# Patient Record
Sex: MALE | Race: WHITE | NOT HISPANIC OR LATINO | URBAN - METROPOLITAN AREA
[De-identification: names, ages, dates, MRNs, and addresses within clinical notes are randomized per-mention and may not be internally consistent; named-entity substitution may affect disease eponyms.]

---

## 2019-02-07 ENCOUNTER — INPATIENT (INPATIENT)
Facility: HOSPITAL | Age: 59
LOS: 3 days | Discharge: HOME | End: 2019-02-11
Attending: INTERNAL MEDICINE | Admitting: INTERNAL MEDICINE
Payer: MEDICAID

## 2019-02-07 VITALS
HEART RATE: 92 BPM | OXYGEN SATURATION: 97 % | WEIGHT: 176.37 LBS | TEMPERATURE: 96 F | DIASTOLIC BLOOD PRESSURE: 72 MMHG | SYSTOLIC BLOOD PRESSURE: 119 MMHG | RESPIRATION RATE: 18 BRPM

## 2019-02-07 DIAGNOSIS — H10.33 UNSPECIFIED ACUTE CONJUNCTIVITIS, BILATERAL: ICD-10-CM

## 2019-02-07 DIAGNOSIS — F10.10 ALCOHOL ABUSE, UNCOMPLICATED: ICD-10-CM

## 2019-02-07 DIAGNOSIS — R03.0 ELEVATED BLOOD-PRESSURE READING, WITHOUT DIAGNOSIS OF HYPERTENSION: ICD-10-CM

## 2019-02-07 LAB
ALBUMIN SERPL ELPH-MCNC: 4.4 G/DL — SIGNIFICANT CHANGE UP (ref 3.5–5.2)
ALP SERPL-CCNC: 78 U/L — SIGNIFICANT CHANGE UP (ref 30–115)
ALT FLD-CCNC: 29 U/L — SIGNIFICANT CHANGE UP (ref 0–41)
ANION GAP SERPL CALC-SCNC: 17 MMOL/L — HIGH (ref 7–14)
APAP SERPL-MCNC: <5 UG/ML — LOW (ref 10–30)
APPEARANCE UR: CLEAR — SIGNIFICANT CHANGE UP
AST SERPL-CCNC: 64 U/L — HIGH (ref 0–41)
BILIRUB SERPL-MCNC: 0.9 MG/DL — SIGNIFICANT CHANGE UP (ref 0.2–1.2)
BILIRUB UR-MCNC: NEGATIVE — SIGNIFICANT CHANGE UP
BUN SERPL-MCNC: 8 MG/DL — LOW (ref 10–20)
CALCIUM SERPL-MCNC: 8.9 MG/DL — SIGNIFICANT CHANGE UP (ref 8.5–10.1)
CHLORIDE SERPL-SCNC: 101 MMOL/L — SIGNIFICANT CHANGE UP (ref 98–110)
CO2 SERPL-SCNC: 28 MMOL/L — SIGNIFICANT CHANGE UP (ref 17–32)
COLOR SPEC: YELLOW — SIGNIFICANT CHANGE UP
CREAT SERPL-MCNC: 0.7 MG/DL — SIGNIFICANT CHANGE UP (ref 0.7–1.5)
DIFF PNL FLD: NEGATIVE — SIGNIFICANT CHANGE UP
ETHANOL SERPL-MCNC: 313 MG/DL — HIGH
GLUCOSE SERPL-MCNC: 101 MG/DL — HIGH (ref 70–99)
GLUCOSE UR QL: NEGATIVE MG/DL — SIGNIFICANT CHANGE UP
HCT VFR BLD CALC: 40.1 % — LOW (ref 42–52)
HGB BLD-MCNC: 13.6 G/DL — LOW (ref 14–18)
KETONES UR-MCNC: 40
LEUKOCYTE ESTERASE UR-ACNC: NEGATIVE — SIGNIFICANT CHANGE UP
MCHC RBC-ENTMCNC: 33.9 G/DL — SIGNIFICANT CHANGE UP (ref 32–37)
MCHC RBC-ENTMCNC: 35.6 PG — HIGH (ref 27–31)
MCV RBC AUTO: 105 FL — HIGH (ref 80–94)
NITRITE UR-MCNC: NEGATIVE — SIGNIFICANT CHANGE UP
NRBC # BLD: 0 /100 WBCS — SIGNIFICANT CHANGE UP (ref 0–0)
PH UR: 6 — SIGNIFICANT CHANGE UP (ref 5–8)
PLATELET # BLD AUTO: 156 K/UL — SIGNIFICANT CHANGE UP (ref 130–400)
POTASSIUM SERPL-MCNC: 4.2 MMOL/L — SIGNIFICANT CHANGE UP (ref 3.5–5)
POTASSIUM SERPL-SCNC: 4.2 MMOL/L — SIGNIFICANT CHANGE UP (ref 3.5–5)
PROT SERPL-MCNC: 7.3 G/DL — SIGNIFICANT CHANGE UP (ref 6–8)
PROT UR-MCNC: ABNORMAL MG/DL
RBC # BLD: 3.82 M/UL — LOW (ref 4.7–6.1)
RBC # FLD: 11.2 % — LOW (ref 11.5–14.5)
SALICYLATES SERPL-MCNC: <0.3 MG/DL — LOW (ref 4–30)
SODIUM SERPL-SCNC: 146 MMOL/L — SIGNIFICANT CHANGE UP (ref 135–146)
SP GR SPEC: >=1.03 (ref 1.01–1.03)
TROPONIN T SERPL-MCNC: <0.01 NG/ML — SIGNIFICANT CHANGE UP
UROBILINOGEN FLD QL: 1 MG/DL (ref 0.2–0.2)
WBC # BLD: 5.86 K/UL — SIGNIFICANT CHANGE UP (ref 4.8–10.8)
WBC # FLD AUTO: 5.86 K/UL — SIGNIFICANT CHANGE UP (ref 4.8–10.8)

## 2019-02-07 RX ORDER — MAGNESIUM HYDROXIDE 400 MG/1
30 TABLET, CHEWABLE ORAL ONCE
Qty: 0 | Refills: 0 | Status: DISCONTINUED | OUTPATIENT
Start: 2019-02-07 | End: 2019-02-11

## 2019-02-07 RX ORDER — MULTIVIT-MIN/FERROUS GLUCONATE 9 MG/15 ML
1 LIQUID (ML) ORAL DAILY
Qty: 0 | Refills: 0 | Status: DISCONTINUED | OUTPATIENT
Start: 2019-02-07 | End: 2019-02-11

## 2019-02-07 RX ORDER — INFLUENZA VIRUS VACCINE 15; 15; 15; 15 UG/.5ML; UG/.5ML; UG/.5ML; UG/.5ML
0.5 SUSPENSION INTRAMUSCULAR ONCE
Qty: 0 | Refills: 0 | Status: COMPLETED | OUTPATIENT
Start: 2019-02-07 | End: 2019-02-08

## 2019-02-07 RX ORDER — ACETAMINOPHEN 500 MG
650 TABLET ORAL EVERY 4 HOURS
Qty: 0 | Refills: 0 | Status: DISCONTINUED | OUTPATIENT
Start: 2019-02-07 | End: 2019-02-11

## 2019-02-07 RX ORDER — TUBERCULIN PURIFIED PROTEIN DERIVATIVE 5 [IU]/.1ML
5 INJECTION, SOLUTION INTRADERMAL ONCE
Qty: 0 | Refills: 0 | Status: COMPLETED | OUTPATIENT
Start: 2019-02-07 | End: 2019-02-08

## 2019-02-07 RX ORDER — PSEUDOEPHEDRINE HCL 30 MG
60 TABLET ORAL EVERY 6 HOURS
Qty: 0 | Refills: 0 | Status: DISCONTINUED | OUTPATIENT
Start: 2019-02-07 | End: 2019-02-11

## 2019-02-07 RX ORDER — HYDROXYZINE HCL 10 MG
100 TABLET ORAL AT BEDTIME
Qty: 0 | Refills: 0 | Status: DISCONTINUED | OUTPATIENT
Start: 2019-02-07 | End: 2019-02-11

## 2019-02-07 RX ORDER — HYDROXYZINE HCL 10 MG
50 TABLET ORAL EVERY 6 HOURS
Qty: 0 | Refills: 0 | Status: DISCONTINUED | OUTPATIENT
Start: 2019-02-07 | End: 2019-02-11

## 2019-02-07 RX ORDER — IBUPROFEN 200 MG
400 TABLET ORAL EVERY 6 HOURS
Qty: 0 | Refills: 0 | Status: DISCONTINUED | OUTPATIENT
Start: 2019-02-07 | End: 2019-02-11

## 2019-02-07 RX ORDER — METHOCARBAMOL 500 MG/1
500 TABLET, FILM COATED ORAL EVERY 6 HOURS
Qty: 0 | Refills: 0 | Status: DISCONTINUED | OUTPATIENT
Start: 2019-02-07 | End: 2019-02-11

## 2019-02-07 RX ORDER — POLYMYXIN B SULF/TRIMETHOPRIM 10000-1/ML
1 DROPS OPHTHALMIC (EYE) EVERY 6 HOURS
Qty: 0 | Refills: 0 | Status: DISCONTINUED | OUTPATIENT
Start: 2019-02-07 | End: 2019-02-11

## 2019-02-07 RX ORDER — POLYMYXIN B SULF/TRIMETHOPRIM 10000-1/ML
1 DROPS OPHTHALMIC (EYE) ONCE
Qty: 0 | Refills: 0 | Status: COMPLETED | OUTPATIENT
Start: 2019-02-07 | End: 2019-02-07

## 2019-02-07 RX ORDER — THIAMINE MONONITRATE (VIT B1) 100 MG
100 TABLET ORAL DAILY
Qty: 0 | Refills: 0 | Status: COMPLETED | OUTPATIENT
Start: 2019-02-07 | End: 2019-02-09

## 2019-02-07 RX ADMIN — Medication 100 MILLIGRAM(S): at 19:11

## 2019-02-07 RX ADMIN — Medication 1 TABLET(S): at 19:11

## 2019-02-07 RX ADMIN — Medication 1 DROP(S): at 15:40

## 2019-02-07 RX ADMIN — Medication 50 MILLIGRAM(S): at 21:20

## 2019-02-07 RX ADMIN — Medication 50 MILLIGRAM(S): at 19:11

## 2019-02-07 NOTE — H&P ADULT - ATTENDING COMMENTS
Patient interviewed and examined. B/L Conjunctival injection crusting OS.    Chart reviewed.    PA's H&P noted and modified, as appropriate.    Case discussed on team rounds    Following is my summary of the case.    Admitted for detox: from ____ED, _x__Intake, ____Med/Surg Floor    Alcohol__x__   Opioid_____  Benzo___ Other_____    Substance amount, duration of use, last usage, and prior attempts at detox or rehabs, are outlined above in the H&P and discussed with patient.    Associated withdrawal symptoms presents.  Comorbid conditions noted. Chronic and Stable.    Past Medical Hx, Psych Hx, family Hx, Social Hx from H&P reviewed and NO changes.    Old medical record and medication Hx. Reviewed    Following items reviewed and addressed:  1. labs  2. EKG  3. Imaging from PACs module    Examination: no change from PA's exam.    Place on following protocol  _____Medically Managed  __X__Medically Supervised    Ciwa_____Librium taper___x_Ativan taper___Methadone taper___ Phenobarb taper____ Suboxone Induction____MMTP____    Narcan Kit Offered    Psych Consult __X__N/A  ___Ordered    Physical Therapy  ___X    ___  Ordered    Aftercare disposition to be addressed by counselors.    Estimated length of stay 3-5 days.

## 2019-02-07 NOTE — H&P ADULT - NSHPREVIEWOFSYSTEMS_GEN_ALL_CORE
General:	no fever, weight loss,  chills  Skin: no rash, ulcers  Ophthalmologic: no visual changes  ENMT:	no sore throat  Respiratory and Thorax: no cough, wheeze,  sob  Cardiovascular:	no chest pain, palpitations, dizziness  Gastrointestinal:	no nausea, vomiting, diarrhea, abd pain  Genitourinary:	no dysuria, hematuria  Musculoskeletal:	no joint pains  Neurological:	 no speech disturbance, focal weakness, numbness  Psychiatric:	no depression, anxiety, psychosis  Hematology/Lymphatics:	no anemia  Endocrine:	no polyuria, polydipsia

## 2019-02-07 NOTE — ED ADULT NURSE NOTE - NSIMPLEMENTINTERV_GEN_ALL_ED
Implemented All Fall with Harm Risk Interventions:  [place] to call system. Call bell, personal items and telephone within reach. Instruct patient to call for assistance. Room bathroom lighting operational. Non-slip footwear when patient is off stretcher. Physically safe environment: no spills, clutter or unnecessary equipment. Stretcher in lowest position, wheels locked, appropriate side rails in place. Provide visual cue, wrist band, yellow gown, etc. Monitor gait and stability. Monitor for mental status changes and reorient to person, place, and time. Review medications for side effects contributing to fall risk. Reinforce activity limits and safety measures with patient and family. Provide visual clues: red socks.

## 2019-02-07 NOTE — H&P ADULT - NSHPPHYSICALEXAM_GEN_ALL_CORE
Vital Signs Last 24 Hrs  T(C): 36.5 (07 Feb 2019 17:03), Max: 36.5 (07 Feb 2019 17:03)  T(F): 97.7 (07 Feb 2019 17:03), Max: 97.7 (07 Feb 2019 17:03)  HR: 90 (07 Feb 2019 17:03) (90 - 92)  BP: 116/69 (07 Feb 2019 17:03) (116/69 - 119/72)  BP(mean): --  RR: 18 (07 Feb 2019 17:03) (18 - 18)  SpO2: 97% (07 Feb 2019 17:03) (97% - 97%)    PHYSICAL EXAM:      Constitutional: A&Ox4  Respiratory: cta b/l  Cardiovascular: s1 s2 rrr  Gastrointestinal: soft nt  nd + bs no rebound or guarding  Genitourinary: no cva tenderness  Extremities: normal rom, + edema, no calf tenderness  Neurological:no focal deficits  Skin: no rash

## 2019-02-07 NOTE — ED PROVIDER NOTE - PHYSICAL EXAMINATION
AOx4, Non toxic appearing, NAD, speaking in full sentences.   Skin - warm and dry, no acute rash.   Head - normocephalic, atraumatic.   Eyes - PERRLA/EOMI, conjunctiva and sclera injected with mild pus like DC b/l.   ENT- MM moist, no nasal discharge.  Pharynx unremarkable.    Neck - supple nt, no meningeal signs.   Heart - RRR s1s2 nl, no rub/murmur.   Lungs- No retractions, BS equal, CTAB.   Abdomen - soft ntnd no r/g.   Extremities- moves all, +equal distal pulses, brisk cap refill, sensation wnl, normal ROM. No LE edema, calves nttp b/l.

## 2019-02-07 NOTE — ED PROVIDER NOTE - OBJECTIVE STATEMENT
57 yo M with a hx of chronic ETOH abuse presents for eval from detox. Clint was in intake at detox when he told the PA he had CP on ROS, left sided. PA Alex states the patient told him that he had left sided CP. Once the PA told patient they were sending him here to ED, patient recanted statements. Here, he continues to deny CP. Through  [place] [id number], patient has no complains other than red eyes with some discharge. Denies CP, SOB, back pain, abd pain, NVCD, diaphoresis, fevers, chills.

## 2019-02-07 NOTE — H&P ADULT - PROBLEM SELECTOR PLAN 1
-librium  protocol--pt Citizen of [place] speaking and high risk for withdrawl seizure based onlong duration of heavy use  -prn medications  -check labs and PPD  -counseling  -social service evaluation

## 2019-02-07 NOTE — H&P ADULT - HISTORY OF PRESENT ILLNESS
59yo M presents for detox.    Substance: etoh  amount:1liter plus beer daily  duration: 1.5 years  last use: today    Benzo use: denies  Cocaine use: denies  Opiate use: denies    Seizures: denies  tremors: yes  black outs: denies  DT's: denies  Hallucinations: denies      Hep C: denies  HIV: denies  Syphilis: denies  PPD positive: denies    Mental illness HX: denies  AH/SI/HI: denies

## 2019-02-07 NOTE — H&P ADULT - NSHPLABSRESULTS_GEN_ALL_CORE
13.6   5.86  )-----------( 156      ( 07 Feb 2019 15:30 )             40.1       02-07    146  |  101  |  8<L>  ----------------------------<  101<H>  4.2   |  28  |  0.7    Ca    8.9      07 Feb 2019 15:30    TPro  7.3  /  Alb  4.4  /  TBili  0.9  /  DBili  x   /  AST  64<H>  /  ALT  29  /  AlkPhos  78  02-07           CARDIAC MARKERS ( 07 Feb 2019 15:30 )  x     / <0.01 ng/mL / x     / x     / x

## 2019-02-07 NOTE — ED PROVIDER NOTE - ATTENDING CONTRIBUTION TO CARE
57 yo M PMHx noted presents from intake for evaluation of chest pain.  Pt presented for detox but on ROS had + CP.  Pt in ED states no CP, no SOB.  Only complains of redness and d/c from eyes, no SI.  On exam pt in NAD AAO x 3, PERRL, + eye injection, Lungs CAT B/L no wrr, abd soft nt nd, no edema, steady gait

## 2019-02-08 DIAGNOSIS — B35.1 TINEA UNGUIUM: ICD-10-CM

## 2019-02-08 DIAGNOSIS — B35.3 TINEA PEDIS: ICD-10-CM

## 2019-02-08 LAB
HAV IGM SER-ACNC: SIGNIFICANT CHANGE UP
HBV CORE IGM SER-ACNC: SIGNIFICANT CHANGE UP
HBV SURFACE AG SER-ACNC: SIGNIFICANT CHANGE UP
HCV AB S/CO SERPL IA: 0.06 S/CO — SIGNIFICANT CHANGE UP
HCV AB SERPL-IMP: SIGNIFICANT CHANGE UP
HIV 1 & 2 AB SERPL IA.RAPID: SIGNIFICANT CHANGE UP
T PALLIDUM AB TITR SER: NEGATIVE — SIGNIFICANT CHANGE UP

## 2019-02-08 PROCEDURE — 99222 1ST HOSP IP/OBS MODERATE 55: CPT

## 2019-02-08 RX ORDER — PANTOPRAZOLE SODIUM 20 MG/1
40 TABLET, DELAYED RELEASE ORAL DAILY
Qty: 0 | Refills: 0 | Status: DISCONTINUED | OUTPATIENT
Start: 2019-02-08 | End: 2019-02-11

## 2019-02-08 RX ADMIN — Medication 100 MILLIGRAM(S): at 00:02

## 2019-02-08 RX ADMIN — Medication 1 DROP(S): at 06:08

## 2019-02-08 RX ADMIN — Medication 1 DROP(S): at 00:02

## 2019-02-08 RX ADMIN — Medication 30 MILLILITER(S): at 00:02

## 2019-02-08 RX ADMIN — Medication 0.1 MILLIGRAM(S): at 12:17

## 2019-02-08 RX ADMIN — TUBERCULIN PURIFIED PROTEIN DERIVATIVE 5 UNIT(S): 5 INJECTION, SOLUTION INTRADERMAL at 12:46

## 2019-02-08 RX ADMIN — Medication 100 MILLIGRAM(S): at 09:10

## 2019-02-08 RX ADMIN — INFLUENZA VIRUS VACCINE 0.5 MILLILITER(S): 15; 15; 15; 15 SUSPENSION INTRAMUSCULAR at 20:50

## 2019-02-08 RX ADMIN — Medication 1 DROP(S): at 18:07

## 2019-02-08 RX ADMIN — Medication 30 MILLILITER(S): at 20:23

## 2019-02-08 RX ADMIN — Medication 50 MILLIGRAM(S): at 06:09

## 2019-02-08 RX ADMIN — Medication 1 DROP(S): at 12:17

## 2019-02-08 RX ADMIN — Medication 1 TABLET(S): at 09:10

## 2019-02-08 RX ADMIN — Medication 50 MILLIGRAM(S): at 14:18

## 2019-02-08 RX ADMIN — Medication 1 APPLICATION(S): at 20:51

## 2019-02-08 RX ADMIN — Medication 50 MILLIGRAM(S): at 09:10

## 2019-02-08 RX ADMIN — Medication 50 MILLIGRAM(S): at 18:07

## 2019-02-08 NOTE — CHART NOTE - NSCHARTNOTEFT_GEN_A_CORE
PPD placed to RFA on 2/8/19. To be read in 48-72 hours.   -Manolo Crooks RRT PPD placed to RFA on 2/8/19. To be read in 48-72 hours.   -Manolo Crooks RRT    PPD read 2/10, 0mm induration.... Veena RRT

## 2019-02-08 NOTE — CONSULT NOTE ADULT - ASSESSMENT
Onychomycosis Rx: Clotrimazole. Podiatry to return to unit to aseptic debridement of toenails when instrument is available.   Tinea Pedis: Rx: Clotrimazole Onychomycosis Rx: Clotrimazole. aseptic debridement of toenails x10 done at bedside   Tinea Pedis: Rx: Clotrimazole

## 2019-02-08 NOTE — CONSULT NOTE ADULT - SUBJECTIVE AND OBJECTIVE BOX
[place] Native Speaker: Donaldo #[id number] Anupama   PODIATRY CONSULT     NAYLA CEDENO is a pleasant well-nourished, well developed 58y Male in no acute distress, alert awake, and oriented to person, place and time.    HPI:  57yo M presents for detox.    Substance: etoh  amount:1liter plus beer daily  duration: 1.5 years  last use: today    Benzo use: denies  Cocaine use: denies  Opiate use: denies    Seizures: denies  tremors: yes  black outs: denies  DT's: denies  Hallucinations: denies    Hep C: denies  HIV: denies  Syphilis: denies  PPD positive: denies    Mental illness HX: denies  AH/SI/HI: denies (07 Feb 2019 18:32)    Podiatry consulted for the evaluation and management of the bilateral feet.     PAST MEDICAL & SURGICAL HISTORY:  Acute bacterial conjunctivitis of both eyes  Alcohol abuse  No significant past surgical history    MEDICATIONS  (STANDING):  chlordiazePOXIDE   Oral   chlordiazePOXIDE 50 milliGRAM(s) Oral every 4 hours  chlordiazePOXIDE 25 milliGRAM(s) Oral every 4 hours  influenza   Vaccine 0.5 milliLiter(s) IntraMuscular once  multivitamin/minerals 1 Tablet(s) Oral daily  PPD  5 Tuberculin Unit(s) Injectable 5 Unit(s) IntraDermal once  thiamine 100 milliGRAM(s) Oral daily  trimethoprim/polymyxin Solution 1 Drop(s) Both EYES every 6 hours    MEDICATIONS  (PRN):  acetaminophen   Tablet .. 650 milliGRAM(s) Oral every 4 hours PRN Temp greater or equal to 38C (100.4F), Mild Pain (1 - 3)  aluminum hydroxide/magnesium hydroxide/simethicone Suspension 30 milliLiter(s) Oral every 6 hours PRN Heartburn  bismuth subsalicylate Liquid 30 milliLiter(s) Oral every 6 hours PRN Diarrhea  chlordiazePOXIDE 50 milliGRAM(s) Oral every 4 hours PRN Withdrawal  guaiFENesin/dextromethorphan  Syrup 5 milliLiter(s) Oral every 4 hours PRN Cough  hydrOXYzine hydrochloride 50 milliGRAM(s) Oral every 6 hours PRN Anxiety  hydrOXYzine hydrochloride 100 milliGRAM(s) Oral at bedtime PRN insomnia  ibuprofen  Tablet. 400 milliGRAM(s) Oral every 6 hours PRN Mild Pain (1 - 3)  magnesium hydroxide Suspension 30 milliLiter(s) Oral once PRN Constipation  methocarbamol 500 milliGRAM(s) Oral every 6 hours PRN muscle pain  pseudoephedrine 60 milliGRAM(s) Oral every 6 hours PRN Rhinitis  trimethobenzamide 300 milliGRAM(s) Oral every 6 hours PRN Nausea and/or Vomiting  trimethobenzamide Injectable 200 milliGRAM(s) IntraMuscular every 6 hours PRN Nausea and/or Vomiting    FAMILY HISTORY:  No pertinent family history in first degree relatives    Vital Signs Last 24 Hrs  T(C): 36.2 (08 Feb 2019 07:08), Max: 36.5 (07 Feb 2019 17:03)  T(F): 97.2 (08 Feb 2019 07:08), Max: 97.7 (07 Feb 2019 17:03)  HR: 58 (08 Feb 2019 07:08) (58 - 92)  BP: 137/85 (08 Feb 2019 07:08) (116/69 - 167/93)  BP(mean): --  RR: 16 (08 Feb 2019 07:08) (16 - 18)  SpO2: 97% (07 Feb 2019 17:03) (97% - 97%)                          13.6   5.86  )-----------( 156      ( 07 Feb 2019 15:30 )             40.1     02-07  146  |  101  |  8<L>  ----------------------------<  101<H>  4.2   |  28  |  0.7  Ca    8.9      07 Feb 2019 15:30  TPro  7.3  /  Alb  4.4  /  TBili  0.9  /  DBili  x   /  AST  64<H>  /  ALT  29  /  AlkPhos  78  02-07    Urinalysis Basic - ( 07 Feb 2019 19:59 )    Color: Yellow / Appearance: Clear / SG: >=1.030 / pH: x  Gluc: x / Ketone: 40  / Bili: Negative / Urobili: 1.0 mg/dL   Blood: x / Protein: Trace mg/dL / Nitrite: Negative   Leuk Esterase: Negative / RBC: x / WBC 1-2 /HPF   Sq Epi: x / Non Sq Epi: Moderate /HPF / Bacteria: Moderate    PHYSICAL EXAM  LE Focused examination conducted:    DERM:  elongated dystrophic toenails x10 with subungual debris. Scalling of the plantar foot skin with annular clearing regions. Malodor to the bilateral feet.    VASC:   Dorsalis Pedis palpable bilaterally  Posterior Tibial palpable bilaterally   Capillary re-fill time less than 3 seconds digits 1-5 bilateral   Temperature gradient: Right Warm to cool. ; Left: warm to cool.  Edema: Right & Left nonpitting   NEURO: Protective sensation intact to the level of the digits bilateral.  ORTHO: Muscle strength 5/5 all major muscle groups bilateral.    A:  Onychomycosis   Tinea pedis     P:  Onychomycosis Rx: Clotrimazole. Podiatry to return to unit to aseptic debridement of toenails when instrument is available.   Tinea Pedis: Rx: Clotrimazole   Podiatry to follow up.   Attending updated and added to note as heidy.     02-08-19 @ 11:06 [place] Native Speaker: Donaldo #[id number] Anupama   PODIATRY CONSULT     NAYLA CEDENO is a pleasant well-nourished, well developed 58y Male in no acute distress, alert awake, and oriented to person, place and time.    HPI:  59yo M presents for detox.    Substance: etoh  amount:1liter plus beer daily  duration: 1.5 years  last use: today    Benzo use: denies  Cocaine use: denies  Opiate use: denies    Seizures: denies  tremors: yes  black outs: denies  DT's: denies  Hallucinations: denies    Hep C: denies  HIV: denies  Syphilis: denies  PPD positive: denies    Mental illness HX: denies  AH/SI/HI: denies (07 Feb 2019 18:32)    Podiatry consulted for the evaluation and management of the bilateral feet.     PAST MEDICAL & SURGICAL HISTORY:  Acute bacterial conjunctivitis of both eyes  Alcohol abuse  No significant past surgical history    MEDICATIONS  (STANDING):  chlordiazePOXIDE   Oral   chlordiazePOXIDE 50 milliGRAM(s) Oral every 4 hours  chlordiazePOXIDE 25 milliGRAM(s) Oral every 4 hours  influenza   Vaccine 0.5 milliLiter(s) IntraMuscular once  multivitamin/minerals 1 Tablet(s) Oral daily  PPD  5 Tuberculin Unit(s) Injectable 5 Unit(s) IntraDermal once  thiamine 100 milliGRAM(s) Oral daily  trimethoprim/polymyxin Solution 1 Drop(s) Both EYES every 6 hours    MEDICATIONS  (PRN):  acetaminophen   Tablet .. 650 milliGRAM(s) Oral every 4 hours PRN Temp greater or equal to 38C (100.4F), Mild Pain (1 - 3)  aluminum hydroxide/magnesium hydroxide/simethicone Suspension 30 milliLiter(s) Oral every 6 hours PRN Heartburn  bismuth subsalicylate Liquid 30 milliLiter(s) Oral every 6 hours PRN Diarrhea  chlordiazePOXIDE 50 milliGRAM(s) Oral every 4 hours PRN Withdrawal  guaiFENesin/dextromethorphan  Syrup 5 milliLiter(s) Oral every 4 hours PRN Cough  hydrOXYzine hydrochloride 50 milliGRAM(s) Oral every 6 hours PRN Anxiety  hydrOXYzine hydrochloride 100 milliGRAM(s) Oral at bedtime PRN insomnia  ibuprofen  Tablet. 400 milliGRAM(s) Oral every 6 hours PRN Mild Pain (1 - 3)  magnesium hydroxide Suspension 30 milliLiter(s) Oral once PRN Constipation  methocarbamol 500 milliGRAM(s) Oral every 6 hours PRN muscle pain  pseudoephedrine 60 milliGRAM(s) Oral every 6 hours PRN Rhinitis  trimethobenzamide 300 milliGRAM(s) Oral every 6 hours PRN Nausea and/or Vomiting  trimethobenzamide Injectable 200 milliGRAM(s) IntraMuscular every 6 hours PRN Nausea and/or Vomiting    FAMILY HISTORY:  No pertinent family history in first degree relatives    Vital Signs Last 24 Hrs  T(C): 36.2 (08 Feb 2019 07:08), Max: 36.5 (07 Feb 2019 17:03)  T(F): 97.2 (08 Feb 2019 07:08), Max: 97.7 (07 Feb 2019 17:03)  HR: 58 (08 Feb 2019 07:08) (58 - 92)  BP: 137/85 (08 Feb 2019 07:08) (116/69 - 167/93)  BP(mean): --  RR: 16 (08 Feb 2019 07:08) (16 - 18)  SpO2: 97% (07 Feb 2019 17:03) (97% - 97%)                          13.6   5.86  )-----------( 156      ( 07 Feb 2019 15:30 )             40.1     02-07  146  |  101  |  8<L>  ----------------------------<  101<H>  4.2   |  28  |  0.7  Ca    8.9      07 Feb 2019 15:30  TPro  7.3  /  Alb  4.4  /  TBili  0.9  /  DBili  x   /  AST  64<H>  /  ALT  29  /  AlkPhos  78  02-07    Urinalysis Basic - ( 07 Feb 2019 19:59 )    Color: Yellow / Appearance: Clear / SG: >=1.030 / pH: x  Gluc: x / Ketone: 40  / Bili: Negative / Urobili: 1.0 mg/dL   Blood: x / Protein: Trace mg/dL / Nitrite: Negative   Leuk Esterase: Negative / RBC: x / WBC 1-2 /HPF   Sq Epi: x / Non Sq Epi: Moderate /HPF / Bacteria: Moderate    PHYSICAL EXAM  LE Focused examination conducted:    DERM:  elongated dystrophic toenails x10 with subungual debris. Scalling of the plantar foot skin with annular clearing regions. Malodor to the bilateral feet.    VASC:   Dorsalis Pedis palpable bilaterally  Posterior Tibial palpable bilaterally   Capillary re-fill time less than 3 seconds digits 1-5 bilateral   Temperature gradient: Right Warm to cool. ; Left: warm to cool.  Edema: Right & Left nonpitting   NEURO: Protective sensation intact to the level of the digits bilateral.  ORTHO: Muscle strength 5/5 all major muscle groups bilateral.    A:  Onychomycosis   Tinea pedis     P:  Onychomycosis Rx: Clotrimazole.   aseptic debridement of toenails x10 done at bedside.  Tinea Pedis: Rx: Clotrimazole   reconsult as needed  Attending updated and added to note as heidy.     02-08-19 @ 11:06

## 2019-02-08 NOTE — CONSULT NOTE ADULT - PROBLEM SELECTOR RECOMMENDATION 2
Onychomycosis Rx: Clotrimazole. Podiatry to return to unit to aseptic debridement of toenails when instrument is available.

## 2019-02-09 LAB
HCV AB S/CO SERPL IA: 0.05 S/CO — SIGNIFICANT CHANGE UP
HCV AB SERPL-IMP: SIGNIFICANT CHANGE UP

## 2019-02-09 RX ORDER — LANOLIN ALCOHOL/MO/W.PET/CERES
5 CREAM (GRAM) TOPICAL AT BEDTIME
Qty: 0 | Refills: 0 | Status: DISCONTINUED | OUTPATIENT
Start: 2019-02-09 | End: 2019-02-11

## 2019-02-09 RX ORDER — SUCRALFATE 1 G
1 TABLET ORAL
Qty: 0 | Refills: 0 | Status: DISCONTINUED | OUTPATIENT
Start: 2019-02-09 | End: 2019-02-11

## 2019-02-09 RX ADMIN — Medication 1 DROP(S): at 00:12

## 2019-02-09 RX ADMIN — Medication 25 MILLIGRAM(S): at 14:16

## 2019-02-09 RX ADMIN — PANTOPRAZOLE SODIUM 40 MILLIGRAM(S): 20 TABLET, DELAYED RELEASE ORAL at 00:12

## 2019-02-09 RX ADMIN — Medication 25 MILLIGRAM(S): at 17:35

## 2019-02-09 RX ADMIN — Medication 20 MILLIGRAM(S): at 21:15

## 2019-02-09 RX ADMIN — Medication 1 DROP(S): at 12:27

## 2019-02-09 RX ADMIN — Medication 1 APPLICATION(S): at 09:01

## 2019-02-09 RX ADMIN — Medication 25 MILLIGRAM(S): at 09:36

## 2019-02-09 RX ADMIN — Medication 1 DROP(S): at 06:51

## 2019-02-09 RX ADMIN — Medication 1 GRAM(S): at 21:15

## 2019-02-09 RX ADMIN — Medication 1 TABLET(S): at 09:01

## 2019-02-09 RX ADMIN — Medication 1 DROP(S): at 23:57

## 2019-02-09 RX ADMIN — Medication 1 GRAM(S): at 17:34

## 2019-02-09 RX ADMIN — PANTOPRAZOLE SODIUM 40 MILLIGRAM(S): 20 TABLET, DELAYED RELEASE ORAL at 09:01

## 2019-02-09 RX ADMIN — Medication 1 DROP(S): at 17:36

## 2019-02-09 RX ADMIN — Medication 1 GRAM(S): at 12:26

## 2019-02-09 RX ADMIN — Medication 1 APPLICATION(S): at 21:15

## 2019-02-09 RX ADMIN — Medication 100 MILLIGRAM(S): at 00:13

## 2019-02-09 RX ADMIN — Medication 5 MILLIGRAM(S): at 23:52

## 2019-02-09 RX ADMIN — Medication 100 MILLIGRAM(S): at 09:03

## 2019-02-09 NOTE — CHART NOTE - NSCHARTNOTEFT_GEN_A_CORE
Subsequent Inpatient Encounter                                       Detox Unit    NAYLA CEDENO   58y   Male      Chief Complaint:    Follow up for Alcohol  Dependency    HPI:     I reviewed previous notes. No Change, except if noted below.             Detail:_    ROS:   I reviewed with patient.  No changes from previous notes except if noted below.             Detail: _    PFSH I reviewed with patient. No changes from previous notes except if noted below.             Detail_    Medication reconciliation performed.    MEDICATIONS  (STANDING):  chlordiazePOXIDE   Oral   chlordiazePOXIDE 25 milliGRAM(s) Oral every 4 hours  chlordiazePOXIDE 20 milliGRAM(s) Oral every 4 hours  clotrimazole 1% Solution 1 Application(s) Topical two times a day  multivitamin/minerals 1 Tablet(s) Oral daily  pantoprazole    Tablet 40 milliGRAM(s) Oral daily  sucralfate 1 Gram(s) Oral four times a day  thiamine 100 milliGRAM(s) Oral daily  trimethoprim/polymyxin Solution 1 Drop(s) Both EYES every 6 hours      MEDICATIONS  (PRN):  acetaminophen   Tablet .. 650 milliGRAM(s) Oral every 4 hours PRN Temp greater or equal to 38C (100.4F), Mild Pain (1 - 3)  aluminum hydroxide/magnesium hydroxide/simethicone Suspension 30 milliLiter(s) Oral every 6 hours PRN Heartburn  bismuth subsalicylate Liquid 30 milliLiter(s) Oral every 6 hours PRN Diarrhea  chlordiazePOXIDE 50 milliGRAM(s) Oral every 4 hours PRN Withdrawal  cloNIDine 0.1 milliGRAM(s) Oral every 6 hours PRN htn  guaiFENesin/dextromethorphan  Syrup 5 milliLiter(s) Oral every 4 hours PRN Cough  hydrOXYzine hydrochloride 50 milliGRAM(s) Oral every 6 hours PRN Anxiety  hydrOXYzine hydrochloride 100 milliGRAM(s) Oral at bedtime PRN insomnia  ibuprofen  Tablet. 400 milliGRAM(s) Oral every 6 hours PRN Mild Pain (1 - 3)  magnesium hydroxide Suspension 30 milliLiter(s) Oral once PRN Constipation  methocarbamol 500 milliGRAM(s) Oral every 6 hours PRN muscle pain  pseudoephedrine 60 milliGRAM(s) Oral every 6 hours PRN Rhinitis  trimethobenzamide 300 milliGRAM(s) Oral every 6 hours PRN Nausea and/or Vomiting  trimethobenzamide Injectable 200 milliGRAM(s) IntraMuscular every 6 hours PRN Nausea and/or Vomiting      T(C): 35.7 (02-09-19 @ 06:00), Max: 36.6 (02-08-19 @ 18:00)  HR: 62 (02-09-19 @ 06:00) (62 - 85)  BP: 133/85 (02-09-19 @ 06:00) (124/64 - 143/99)  RR: 16 (02-09-19 @ 06:00) (16 - 18)  SpO2: --    PHYSICAL EXAM:      Constitutional: NAD, A&O x3    Eyes: PERRLA, no conjuctivitis    Neck: no lymphadenopathy    Respiratory: +air entry, no rales, no rhonchi, no wheezes    Cardiovascular: +S1 and S2, regular rate and rhythm    Gastrointestinal: +BS, soft, non-tender, not distended    Extremities:  no edema, no calf tenderness    Skin: no rashes, normal turgor                            13.6   5.86  )-----------( 156      ( 07 Feb 2019 15:30 )             40.1   02-07    146  |  101  |  8<L>  ----------------------------<  101<H>  4.2   |  28  |  0.7    Ca    8.9      07 Feb 2019 15:30    TPro  7.3  /  Alb  4.4  /  TBili  0.9  /  DBili  x   /  AST  64<H>  /  ALT  29  /  AlkPhos  78  02-07    Treponema Pallidum Antibody Interpretation: Negative (02-07-19 @ 17:00)  Hepatitis B Surface Antigen: Nonreact (02-07-19 @ 17:00)  Hepatitis C Virus S/CO Ratio: 0.05 S/CO (02-08-19 @ 12:15)  Hepatitis C Virus S/CO Ratio: 0.06 S/CO (02-07-19 @ 17:00)    Hepatitis C Virus Interpretation: Nonreact (02-08-19 @ 12:15)  Hepatitis C Virus Interpretation: Nonreact (02-07-19 @ 17:00)      Urinalysis Basic - ( 07 Feb 2019 19:59 )    Color: Yellow / Appearance: Clear / SG: >=1.030 / pH: x  Gluc: x / Ketone: 40  / Bili: Negative / Urobili: 1.0 mg/dL   Blood: x / Protein: Trace mg/dL / Nitrite: Negative   Leuk Esterase: Negative / RBC: x / WBC 1-2 /HPF   Sq Epi: x / Non Sq Epi: Moderate /HPF / Bacteria: Moderate    Drug Screen 1, Urine Result: Done (02-07-19 @ 19:59)        Impression and Plan:    Primary Diagnosis:  Alcohol Dependency                                Medication: Librium Protocol/ CIWA Protocol    Secondary Diagnosis:    insomnia                                                              Medication: add melatonin    Tertiary Diagnosis:           weakness with ambulation                                                            Medication monitor    Conjunctivitis on meds      Continue Detox Protocols. Use of PRNS as needed for withdrawal and comfort.    Adjustments to protocols:    Labs/ Tests reviewed.    Tests ordered:     Likely Disposition: __X_Home       ___Rehab       ___Outpatient Program    ___Self Help     _____Other    Estimated Length of stay:__3__

## 2019-02-10 RX ADMIN — Medication 1 GRAM(S): at 21:25

## 2019-02-10 RX ADMIN — Medication 1 TABLET(S): at 09:17

## 2019-02-10 RX ADMIN — Medication 20 MILLIGRAM(S): at 06:33

## 2019-02-10 RX ADMIN — Medication 1 GRAM(S): at 18:07

## 2019-02-10 RX ADMIN — Medication 1 DROP(S): at 18:07

## 2019-02-10 RX ADMIN — Medication 1 DROP(S): at 09:18

## 2019-02-10 RX ADMIN — PANTOPRAZOLE SODIUM 40 MILLIGRAM(S): 20 TABLET, DELAYED RELEASE ORAL at 09:18

## 2019-02-10 RX ADMIN — Medication 1 APPLICATION(S): at 09:18

## 2019-02-10 RX ADMIN — Medication 1 APPLICATION(S): at 23:51

## 2019-02-10 RX ADMIN — Medication 20 MILLIGRAM(S): at 09:17

## 2019-02-10 RX ADMIN — Medication 1 DROP(S): at 12:11

## 2019-02-10 RX ADMIN — Medication 1 GRAM(S): at 09:17

## 2019-02-10 RX ADMIN — Medication 1 DROP(S): at 23:51

## 2019-02-10 RX ADMIN — Medication 5 MILLIGRAM(S): at 23:51

## 2019-02-10 RX ADMIN — Medication 1 GRAM(S): at 12:20

## 2019-02-10 NOTE — CHART NOTE - NSCHARTNOTEFT_GEN_A_CORE
Subsequent Inpatient Encounter                                       Detox Unit    NAYLA CEDENO   58y   Male      Chief Complaint:    Follow up for Alcohol  Dependency    HPI:     I reviewed previous notes. No Change, except if noted below.             Detail:_    ROS:   I reviewed with patient.  No changes from previous notes except if noted below.             Detail: _    PFSH I reviewed with patient. No changes from previous notes except if noted below.             Detail_    Medication reconciliation performed.    MEDICATIONS  (STANDING):  chlordiazePOXIDE   Oral   chlordiazePOXIDE 20 milliGRAM(s) Oral every 4 hours  chlordiazePOXIDE 10 milliGRAM(s) Oral every 4 hours  clotrimazole 1% Solution 1 Application(s) Topical two times a day  melatonin 5 milliGRAM(s) Oral at bedtime  multivitamin/minerals 1 Tablet(s) Oral daily  pantoprazole    Tablet 40 milliGRAM(s) Oral daily  sucralfate 1 Gram(s) Oral four times a day  trimethoprim/polymyxin Solution 1 Drop(s) Both EYES every 6 hours      MEDICATIONS  (PRN):  acetaminophen   Tablet .. 650 milliGRAM(s) Oral every 4 hours PRN Temp greater or equal to 38C (100.4F), Mild Pain (1 - 3)  aluminum hydroxide/magnesium hydroxide/simethicone Suspension 30 milliLiter(s) Oral every 6 hours PRN Heartburn  bismuth subsalicylate Liquid 30 milliLiter(s) Oral every 6 hours PRN Diarrhea  chlordiazePOXIDE 50 milliGRAM(s) Oral every 4 hours PRN Withdrawal  cloNIDine 0.1 milliGRAM(s) Oral every 6 hours PRN htn  guaiFENesin/dextromethorphan  Syrup 5 milliLiter(s) Oral every 4 hours PRN Cough  hydrOXYzine hydrochloride 50 milliGRAM(s) Oral every 6 hours PRN Anxiety  hydrOXYzine hydrochloride 100 milliGRAM(s) Oral at bedtime PRN insomnia  ibuprofen  Tablet. 400 milliGRAM(s) Oral every 6 hours PRN Mild Pain (1 - 3)  magnesium hydroxide Suspension 30 milliLiter(s) Oral once PRN Constipation  methocarbamol 500 milliGRAM(s) Oral every 6 hours PRN muscle pain  pseudoephedrine 60 milliGRAM(s) Oral every 6 hours PRN Rhinitis  trimethobenzamide 300 milliGRAM(s) Oral every 6 hours PRN Nausea and/or Vomiting  trimethobenzamide Injectable 200 milliGRAM(s) IntraMuscular every 6 hours PRN Nausea and/or Vomiting      T(C): 36 (02-10-19 @ 06:00), Max: 36.4 (02-10-19 @ 00:00)  HR: 60 (02-10-19 @ 06:00) (60 - 84)  BP: 109/64 (02-10-19 @ 06:00) (100/50 - 139/93)  RR: 16 (02-10-19 @ 06:00) (14 - 16)  SpO2: --    PHYSICAL EXAM:      Constitutional: NAD, A&O x3    Eyes: PERRLA, no conjuctivitis    Neck: no lymphadenopathy    Respiratory: +air entry, no rales, no rhonchi, no wheezes    Cardiovascular: +S1 and S2, regular rate and rhythm    Gastrointestinal: +BS, soft, non-tender, not distended    Extremities:  no edema, no calf tenderness    Skin: no rashes, normal turgor            Treponema Pallidum Antibody Interpretation: Negative (02-07-19 @ 17:00)  Hepatitis B Surface Antigen: Nonreact (02-07-19 @ 17:00)  Hepatitis C Virus S/CO Ratio: 0.05 S/CO (02-08-19 @ 12:15)  Hepatitis C Virus S/CO Ratio: 0.06 S/CO (02-07-19 @ 17:00)    Hepatitis C Virus Interpretation: Nonreact (02-08-19 @ 12:15)  Hepatitis C Virus Interpretation: Nonreact (02-07-19 @ 17:00)      Drug Screen 1, Urine Result: Done (02-07-19 @ 19:59)        Impression and Plan:    Primary Diagnosis:  Alcohol Dependency                                Medication: Librium Protocol/ CIWA Protocol    Secondary Diagnosis:     Conjunctivits                                         Medication: on drops    Tertiary Diagnosis:        Athletes foot                                         Medication on meds    Insomnia on meds    GERD on carafate      Continue Detox Protocols. Use of PRNS as needed for withdrawal and comfort.    Adjustments to protocols:    Labs/ Tests reviewed.    Tests ordered:     Likely Disposition: _X__Home       ___Rehab       ___Outpatient Program    ___Self Help     _____Other    Estimated Length of stay:__4__    USE of  number [id number]

## 2019-02-10 NOTE — CHART NOTE - NSCHARTNOTEFT_GEN_A_CORE
Allergies:  No Known Allergies      Diet: Regular    Activity: as tolerated    Follow up with    1. PMD in 2 weeks    2. Psych in 2 weeks    3.    Follow up for abnormal labs/tests    1.    Extra Instructions:      Flu Vaccine given  Yes_____         No______      Diagnosis:  Chemical Dependency   Maintain sobriety  refrain from all use      Patient Signature___________________________________________  Date_________________      Nurse Signature_____________________________________________Date_________________ Allergies:  No Known Allergies      Diet: Regular    Activity: as tolerated    Follow up with    1. PMD in 2 weeks    2. Psych in 2 weeks    3. ophthomology/dermatology/Gastroenterology    Follow up for abnormal labs/tests    1.    Extra Instructions:      Flu Vaccine given  Yes_____         No______      Diagnosis:  Chemical Dependency   Maintain sobriety  refrain from all use      Patient Signature___________________________________________  Date_________________      Nurse Signature_____________________________________________Date_________________

## 2019-02-11 VITALS
HEART RATE: 68 BPM | DIASTOLIC BLOOD PRESSURE: 85 MMHG | SYSTOLIC BLOOD PRESSURE: 120 MMHG | TEMPERATURE: 97 F | RESPIRATION RATE: 18 BRPM

## 2019-02-11 RX ADMIN — Medication 1 TABLET(S): at 09:35

## 2019-02-11 RX ADMIN — Medication 1 GRAM(S): at 09:34

## 2019-02-11 RX ADMIN — PANTOPRAZOLE SODIUM 40 MILLIGRAM(S): 20 TABLET, DELAYED RELEASE ORAL at 09:35

## 2019-02-11 RX ADMIN — Medication 1 APPLICATION(S): at 09:35

## 2019-02-11 RX ADMIN — Medication 1 DROP(S): at 09:36

## 2019-02-11 RX ADMIN — Medication 10 MILLIGRAM(S): at 09:34

## 2019-02-11 NOTE — CHART NOTE - NSCHARTNOTEFT_GEN_A_CORE
Subsequent Inpatient Encounter                                       Detox Unit    NAYLA CEDENO   58y   Male      Chief Complaint:    Follow up for Alcohol  Dependency    HPI:     I reviewed previous notes. No Change, except if noted below.             Detail:_    ROS:   I reviewed with patient.  No changes from previous notes except if noted below.             Detail: _    PFSH I reviewed with patient. No changes from previous notes except if noted below.             Detail_    Medication reconciliation performed.    MEDICATIONS  (STANDING):  chlordiazePOXIDE   Oral   chlordiazePOXIDE 20 milliGRAM(s) Oral every 4 hours  chlordiazePOXIDE 10 milliGRAM(s) Oral every 4 hours  clotrimazole 1% Solution 1 Application(s) Topical two times a day  melatonin 5 milliGRAM(s) Oral at bedtime  multivitamin/minerals 1 Tablet(s) Oral daily  pantoprazole    Tablet 40 milliGRAM(s) Oral daily  sucralfate 1 Gram(s) Oral four times a day  trimethoprim/polymyxin Solution 1 Drop(s) Both EYES every 6 hours      MEDICATIONS  (PRN):  acetaminophen   Tablet .. 650 milliGRAM(s) Oral every 4 hours PRN Temp greater or equal to 38C (100.4F), Mild Pain (1 - 3)  aluminum hydroxide/magnesium hydroxide/simethicone Suspension 30 milliLiter(s) Oral every 6 hours PRN Heartburn  bismuth subsalicylate Liquid 30 milliLiter(s) Oral every 6 hours PRN Diarrhea  chlordiazePOXIDE 50 milliGRAM(s) Oral every 4 hours PRN Withdrawal  cloNIDine 0.1 milliGRAM(s) Oral every 6 hours PRN htn  guaiFENesin/dextromethorphan  Syrup 5 milliLiter(s) Oral every 4 hours PRN Cough  hydrOXYzine hydrochloride 50 milliGRAM(s) Oral every 6 hours PRN Anxiety  hydrOXYzine hydrochloride 100 milliGRAM(s) Oral at bedtime PRN insomnia  ibuprofen  Tablet. 400 milliGRAM(s) Oral every 6 hours PRN Mild Pain (1 - 3)  magnesium hydroxide Suspension 30 milliLiter(s) Oral once PRN Constipation  methocarbamol 500 milliGRAM(s) Oral every 6 hours PRN muscle pain  pseudoephedrine 60 milliGRAM(s) Oral every 6 hours PRN Rhinitis  trimethobenzamide 300 milliGRAM(s) Oral every 6 hours PRN Nausea and/or Vomiting  trimethobenzamide Injectable 200 milliGRAM(s) IntraMuscular every 6 hours PRN Nausea and/or Vomiting      T(C): 36 (02-10-19 @ 06:00), Max: 36.4 (02-10-19 @ 00:00)  HR: 60 (02-10-19 @ 06:00) (60 - 84)  BP: 109/64 (02-10-19 @ 06:00) (100/50 - 139/93)  RR: 16 (02-10-19 @ 06:00) (14 - 16)  SpO2: --    PHYSICAL EXAM:      Constitutional: NAD, A&O x3    Eyes: PERRLA, no conjuctivitis    Neck: no lymphadenopathy    Respiratory: +air entry, no rales, no rhonchi, no wheezes    Cardiovascular: +S1 and S2, regular rate and rhythm    Gastrointestinal: +BS, soft, non-tender, not distended    Extremities:  no edema, no calf tenderness    Skin: no rashes, normal turgor            Treponema Pallidum Antibody Interpretation: Negative (02-07-19 @ 17:00)  Hepatitis B Surface Antigen: Nonreact (02-07-19 @ 17:00)  Hepatitis C Virus S/CO Ratio: 0.05 S/CO (02-08-19 @ 12:15)  Hepatitis C Virus S/CO Ratio: 0.06 S/CO (02-07-19 @ 17:00)    Hepatitis C Virus Interpretation: Nonreact (02-08-19 @ 12:15)  Hepatitis C Virus Interpretation: Nonreact (02-07-19 @ 17:00)      Drug Screen 1, Urine Result: Done (02-07-19 @ 19:59)        Impression and Plan:    Primary Diagnosis:  Alcohol Dependency                                Medication: Librium Protocol/ CIWA Protocol completed.  For discharge today    Secondary Diagnosis:     Conjunctivits                                         Medication: on drops    Tertiary Diagnosis:        Athletes foot                                         Medication on meds    Insomnia on meds    GERD on carafate      Continue Detox Protocols. Use of PRNS as needed for withdrawal and comfort.    Adjustments to protocols:    Labs/ Tests reviewed.    Tests ordered:     Likely Disposition: _X__Home       ___Rehab       ___Outpatient Program    ___Self Help     _____Other    Estimated Length of stay:__4__    USE of  number [id number]

## 2019-02-14 DIAGNOSIS — F10.220 ALCOHOL DEPENDENCE WITH INTOXICATION, UNCOMPLICATED: ICD-10-CM

## 2019-02-14 DIAGNOSIS — G47.00 INSOMNIA, UNSPECIFIED: ICD-10-CM

## 2019-02-14 DIAGNOSIS — H10.33 UNSPECIFIED ACUTE CONJUNCTIVITIS, BILATERAL: ICD-10-CM

## 2019-02-14 DIAGNOSIS — F17.210 NICOTINE DEPENDENCE, CIGARETTES, UNCOMPLICATED: ICD-10-CM

## 2019-02-14 DIAGNOSIS — Z56.0 UNEMPLOYMENT, UNSPECIFIED: ICD-10-CM

## 2019-02-14 DIAGNOSIS — Z59.0 HOMELESSNESS: ICD-10-CM

## 2019-02-14 DIAGNOSIS — K21.9 GASTRO-ESOPHAGEAL REFLUX DISEASE WITHOUT ESOPHAGITIS: ICD-10-CM

## 2019-02-14 DIAGNOSIS — B35.3 TINEA PEDIS: ICD-10-CM
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
## 2019-02-14 DIAGNOSIS — R03.0 ELEVATED BLOOD-PRESSURE READING, WITHOUT DIAGNOSIS OF HYPERTENSION: ICD-10-CM

## 2019-02-14 DIAGNOSIS — F10.20 ALCOHOL DEPENDENCE, UNCOMPLICATED: ICD-10-CM

## 2019-02-14 DIAGNOSIS — B35.1 TINEA UNGUIUM: ICD-10-CM

## 2019-02-14 DIAGNOSIS — R07.9 CHEST PAIN, UNSPECIFIED: ICD-10-CM

## 2019-02-14 SDOH — ECONOMIC STABILITY - INCOME SECURITY: UNEMPLOYMENT, UNSPECIFIED: Z56.0

## 2019-02-14 SDOH — ECONOMIC STABILITY - HOUSING INSECURITY: HOMELESSNESS: Z59.0

## 2019-03-22 ENCOUNTER — EMERGENCY (EMERGENCY)
Facility: HOSPITAL | Age: 59
LOS: 1 days | Discharge: HOME | End: 2019-03-22
Attending: EMERGENCY MEDICINE | Admitting: EMERGENCY MEDICINE
Payer: SELF-PAY

## 2019-03-22 VITALS
SYSTOLIC BLOOD PRESSURE: 108 MMHG | HEART RATE: 78 BPM | TEMPERATURE: 98 F | RESPIRATION RATE: 19 BRPM | OXYGEN SATURATION: 97 % | DIASTOLIC BLOOD PRESSURE: 75 MMHG

## 2019-03-22 DIAGNOSIS — R41.82 ALTERED MENTAL STATUS, UNSPECIFIED: ICD-10-CM

## 2019-03-22 DIAGNOSIS — F10.129 ALCOHOL ABUSE WITH INTOXICATION, UNSPECIFIED: ICD-10-CM

## 2019-03-22 PROCEDURE — 99285 EMERGENCY DEPT VISIT HI MDM: CPT

## 2019-03-22 PROCEDURE — 99053 MED SERV 10PM-8AM 24 HR FAC: CPT

## 2019-03-22 PROCEDURE — 82962 GLUCOSE BLOOD TEST: CPT

## 2019-03-22 PROCEDURE — 99284 EMERGENCY DEPT VISIT MOD MDM: CPT | Mod: 25

## 2019-03-22 NOTE — ED PROVIDER NOTE - ATTENDING CONTRIBUTION TO CARE
58M bibems for etoh intoxication. pt unable to provide clear hx.  admitted to drinking alcohol tonight.  no head trauma  gen- nad  heent- ncat, clear conj  cv -rrr  lungs -ctab  abd - soft, nt, nd  ext -wwp  neuro -gonsalves  etoh intox, no evidence of head injury, will discharge upon clinical sobriety